# Patient Record
Sex: FEMALE | URBAN - METROPOLITAN AREA
[De-identification: names, ages, dates, MRNs, and addresses within clinical notes are randomized per-mention and may not be internally consistent; named-entity substitution may affect disease eponyms.]

---

## 2018-08-03 ENCOUNTER — HOSPITAL ENCOUNTER (INPATIENT)
Age: 58
LOS: 6 days | Discharge: HOME HEALTH | DRG: 885 | End: 2018-08-09
Attending: PSYCHIATRY & NEUROLOGY | Admitting: PSYCHIATRY & NEUROLOGY
Payer: MEDICARE

## 2018-08-03 PROBLEM — F33.3 DEPRESSION, MAJOR, RECURRENT, SEVERE WITH PSYCHOSIS (HCC): Status: ACTIVE | Noted: 2018-08-03

## 2018-08-03 PROBLEM — F20.9 SCHIZOPHRENIA (HCC): Status: ACTIVE | Noted: 2018-08-03

## 2018-08-03 PROCEDURE — 6370000000 HC RX 637 (ALT 250 FOR IP): Performed by: NURSE PRACTITIONER

## 2018-08-03 PROCEDURE — 1240000000 HC EMOTIONAL WELLNESS R&B

## 2018-08-03 RX ORDER — LORAZEPAM 2 MG/ML
1 INJECTION INTRAMUSCULAR EVERY 4 HOURS PRN
Status: DISCONTINUED | OUTPATIENT
Start: 2018-08-03 | End: 2018-08-09 | Stop reason: HOSPADM

## 2018-08-03 RX ORDER — RIZATRIPTAN BENZOATE 10 MG/1
10 TABLET ORAL DAILY PRN
COMMUNITY

## 2018-08-03 RX ORDER — TIZANIDINE 4 MG/1
4 TABLET ORAL 3 TIMES DAILY
COMMUNITY

## 2018-08-03 RX ORDER — THERMOMETER, ELECTRONIC,ORAL
1 EACH MISCELLANEOUS 2 TIMES DAILY
Status: DISCONTINUED | OUTPATIENT
Start: 2018-08-03 | End: 2018-08-09 | Stop reason: HOSPADM

## 2018-08-03 RX ORDER — SUMATRIPTAN 100 MG/1
100 TABLET, FILM COATED ORAL ONCE
Status: COMPLETED | OUTPATIENT
Start: 2018-08-03 | End: 2018-08-06

## 2018-08-03 RX ORDER — FLUOXETINE HYDROCHLORIDE 40 MG/1
40 CAPSULE ORAL DAILY
COMMUNITY

## 2018-08-03 RX ORDER — MAGNESIUM HYDROXIDE/ALUMINUM HYDROXICE/SIMETHICONE 120; 1200; 1200 MG/30ML; MG/30ML; MG/30ML
30 SUSPENSION ORAL EVERY 6 HOURS PRN
Status: DISCONTINUED | OUTPATIENT
Start: 2018-08-03 | End: 2018-08-09 | Stop reason: HOSPADM

## 2018-08-03 RX ORDER — OLANZAPINE 10 MG/1
10 TABLET ORAL NIGHTLY
COMMUNITY

## 2018-08-03 RX ORDER — TRAZODONE HYDROCHLORIDE 100 MG/1
200 TABLET ORAL NIGHTLY PRN
Status: DISCONTINUED | OUTPATIENT
Start: 2018-08-03 | End: 2018-08-09 | Stop reason: HOSPADM

## 2018-08-03 RX ORDER — ACETAMINOPHEN 325 MG/1
650 TABLET ORAL EVERY 4 HOURS PRN
Status: DISCONTINUED | OUTPATIENT
Start: 2018-08-03 | End: 2018-08-09 | Stop reason: HOSPADM

## 2018-08-03 RX ORDER — GABAPENTIN 600 MG/1
600 TABLET ORAL 4 TIMES DAILY
Status: DISCONTINUED | OUTPATIENT
Start: 2018-08-03 | End: 2018-08-09 | Stop reason: HOSPADM

## 2018-08-03 RX ORDER — OLANZAPINE 10 MG/1
10 TABLET ORAL NIGHTLY
Status: DISCONTINUED | OUTPATIENT
Start: 2018-08-03 | End: 2018-08-09 | Stop reason: HOSPADM

## 2018-08-03 RX ORDER — TRAZODONE HYDROCHLORIDE 100 MG/1
200 TABLET ORAL NIGHTLY PRN
COMMUNITY

## 2018-08-03 RX ORDER — ALPRAZOLAM 0.5 MG/1
0.5 TABLET ORAL DAILY
Status: ON HOLD | COMMUNITY
End: 2018-08-09 | Stop reason: HOSPADM

## 2018-08-03 RX ORDER — NICOTINE 21 MG/24HR
1 PATCH, TRANSDERMAL 24 HOURS TRANSDERMAL DAILY
Status: DISCONTINUED | OUTPATIENT
Start: 2018-08-03 | End: 2018-08-09 | Stop reason: HOSPADM

## 2018-08-03 RX ORDER — AMITRIPTYLINE HYDROCHLORIDE 25 MG/1
50 TABLET, FILM COATED ORAL NIGHTLY
Status: DISCONTINUED | OUTPATIENT
Start: 2018-08-03 | End: 2018-08-09 | Stop reason: HOSPADM

## 2018-08-03 RX ORDER — AMITRIPTYLINE HYDROCHLORIDE 50 MG/1
50 TABLET, FILM COATED ORAL NIGHTLY
COMMUNITY

## 2018-08-03 RX ORDER — BENZTROPINE MESYLATE 1 MG/ML
2 INJECTION INTRAMUSCULAR; INTRAVENOUS 2 TIMES DAILY PRN
Status: DISCONTINUED | OUTPATIENT
Start: 2018-08-03 | End: 2018-08-09 | Stop reason: HOSPADM

## 2018-08-03 RX ORDER — GABAPENTIN 600 MG/1
600 TABLET ORAL 4 TIMES DAILY
COMMUNITY

## 2018-08-03 RX ORDER — FLUOXETINE HYDROCHLORIDE 20 MG/1
40 CAPSULE ORAL DAILY
Status: DISCONTINUED | OUTPATIENT
Start: 2018-08-03 | End: 2018-08-09 | Stop reason: HOSPADM

## 2018-08-03 RX ORDER — HYDROXYZINE HYDROCHLORIDE 25 MG/1
25 TABLET, FILM COATED ORAL 4 TIMES DAILY PRN
Status: DISCONTINUED | OUTPATIENT
Start: 2018-08-03 | End: 2018-08-09 | Stop reason: HOSPADM

## 2018-08-03 RX ORDER — HYDROXYZINE PAMOATE 25 MG/1
25 CAPSULE ORAL 4 TIMES DAILY PRN
Status: ON HOLD | COMMUNITY
End: 2018-08-09 | Stop reason: HOSPADM

## 2018-08-03 RX ORDER — BUTALBITAL, ACETAMINOPHEN AND CAFFEINE 50; 325; 40 MG/1; MG/1; MG/1
1 TABLET ORAL 2 TIMES DAILY PRN
Status: DISCONTINUED | OUTPATIENT
Start: 2018-08-03 | End: 2018-08-09 | Stop reason: HOSPADM

## 2018-08-03 RX ORDER — BUTALBITAL, ACETAMINOPHEN AND CAFFEINE 50; 325; 40 MG/1; MG/1; MG/1
1 TABLET ORAL 2 TIMES DAILY PRN
COMMUNITY

## 2018-08-03 RX ORDER — THERMOMETER, ELECTRONIC,ORAL
1 EACH MISCELLANEOUS 2 TIMES DAILY
COMMUNITY

## 2018-08-03 RX ADMIN — BUTALBITAL, ACETAMINOPHEN AND CAFFEINE 1 TABLET: 50; 325; 40 TABLET ORAL at 22:26

## 2018-08-03 RX ADMIN — GABAPENTIN 600 MG: 600 TABLET, FILM COATED ORAL at 13:39

## 2018-08-03 RX ADMIN — OLANZAPINE 10 MG: 10 TABLET, FILM COATED ORAL at 22:29

## 2018-08-03 RX ADMIN — GABAPENTIN 600 MG: 600 TABLET, FILM COATED ORAL at 22:27

## 2018-08-03 RX ADMIN — FLUOXETINE HYDROCHLORIDE 40 MG: 20 CAPSULE ORAL at 13:39

## 2018-08-03 RX ADMIN — TOLNAFTATE 1 UNITS: 10 CREAM TOPICAL at 22:27

## 2018-08-03 RX ADMIN — AMITRIPTYLINE HYDROCHLORIDE 50 MG: 25 TABLET, FILM COATED ORAL at 22:27

## 2018-08-03 RX ADMIN — HYDROXYZINE HYDROCHLORIDE 25 MG: 25 TABLET, FILM COATED ORAL at 18:56

## 2018-08-03 RX ADMIN — GABAPENTIN 600 MG: 600 TABLET, FILM COATED ORAL at 18:51

## 2018-08-03 RX ADMIN — BUTALBITAL, ACETAMINOPHEN AND CAFFEINE 1 TABLET: 50; 325; 40 TABLET ORAL at 13:39

## 2018-08-03 RX ADMIN — TRAZODONE HYDROCHLORIDE 200 MG: 100 TABLET ORAL at 22:26

## 2018-08-03 ASSESSMENT — SLEEP AND FATIGUE QUESTIONNAIRES
AVERAGE NUMBER OF SLEEP HOURS: 2
DO YOU HAVE DIFFICULTY SLEEPING: YES
RESTFUL SLEEP: NO
DO YOU USE A SLEEP AID: NO
DIFFICULTY STAYING ASLEEP: YES
DO YOU HAVE DIFFICULTY SLEEPING: YES
DO YOU USE A SLEEP AID: NO
DIFFICULTY ARISING: NO
DIFFICULTY FALLING ASLEEP: YES
DIFFICULTY FALLING ASLEEP: YES
SLEEP PATTERN: DIFFICULTY FALLING ASLEEP;DISTURBED/INTERRUPTED SLEEP;INSOMNIA
DIFFICULTY ARISING: NO
AVERAGE NUMBER OF SLEEP HOURS: 2
DIFFICULTY STAYING ASLEEP: YES
SLEEP PATTERN: DIFFICULTY FALLING ASLEEP;DISTURBED/INTERRUPTED SLEEP;RESTLESSNESS;INSOMNIA
RESTFUL SLEEP: NO

## 2018-08-03 ASSESSMENT — PAIN SCALES - GENERAL
PAINLEVEL_OUTOF10: 0
PAINLEVEL_OUTOF10: 6
PAINLEVEL_OUTOF10: 3

## 2018-08-03 ASSESSMENT — LIFESTYLE VARIABLES
HISTORY_ALCOHOL_USE: NO
HISTORY_ALCOHOL_USE: NO

## 2018-08-03 NOTE — BH NOTE
Pt approached for RT assessment on this date and pt refused. Pt is in bed, refused to remove blanket and unable to complete assessment. Pt will be assessed at a later date.

## 2018-08-04 LAB
ANION GAP SERPL CALCULATED.3IONS-SCNC: 7 MMOL/L (ref 9–17)
BUN BLDV-MCNC: 14 MG/DL (ref 6–20)
BUN/CREAT BLD: ABNORMAL (ref 9–20)
CALCIUM SERPL-MCNC: 8.3 MG/DL (ref 8.6–10.4)
CHLORIDE BLD-SCNC: 107 MMOL/L (ref 98–107)
CHOLESTEROL/HDL RATIO: 1.9
CHOLESTEROL: 131 MG/DL
CO2: 27 MMOL/L (ref 20–31)
CREAT SERPL-MCNC: 0.54 MG/DL (ref 0.5–0.9)
GFR AFRICAN AMERICAN: >60 ML/MIN
GFR NON-AFRICAN AMERICAN: >60 ML/MIN
GFR SERPL CREATININE-BSD FRML MDRD: ABNORMAL ML/MIN/{1.73_M2}
GFR SERPL CREATININE-BSD FRML MDRD: ABNORMAL ML/MIN/{1.73_M2}
GLUCOSE BLD-MCNC: 102 MG/DL (ref 70–99)
HDLC SERPL-MCNC: 69 MG/DL
LDL CHOLESTEROL: 53 MG/DL (ref 0–130)
POTASSIUM SERPL-SCNC: 4.4 MMOL/L (ref 3.7–5.3)
SODIUM BLD-SCNC: 141 MMOL/L (ref 135–144)
TRIGL SERPL-MCNC: 44 MG/DL
VLDLC SERPL CALC-MCNC: NORMAL MG/DL (ref 1–30)

## 2018-08-04 PROCEDURE — 1240000000 HC EMOTIONAL WELLNESS R&B

## 2018-08-04 PROCEDURE — 90792 PSYCH DIAG EVAL W/MED SRVCS: CPT | Performed by: REGISTERED NURSE

## 2018-08-04 PROCEDURE — 36415 COLL VENOUS BLD VENIPUNCTURE: CPT

## 2018-08-04 PROCEDURE — 6370000000 HC RX 637 (ALT 250 FOR IP): Performed by: NURSE PRACTITIONER

## 2018-08-04 PROCEDURE — 80048 BASIC METABOLIC PNL TOTAL CA: CPT

## 2018-08-04 PROCEDURE — 83036 HEMOGLOBIN GLYCOSYLATED A1C: CPT

## 2018-08-04 PROCEDURE — 99232 SBSQ HOSP IP/OBS MODERATE 35: CPT | Performed by: INTERNAL MEDICINE

## 2018-08-04 PROCEDURE — 80061 LIPID PANEL: CPT

## 2018-08-04 RX ADMIN — FLUOXETINE HYDROCHLORIDE 40 MG: 20 CAPSULE ORAL at 09:17

## 2018-08-04 RX ADMIN — TOLNAFTATE 1 UNITS: 10 CREAM TOPICAL at 09:17

## 2018-08-04 RX ADMIN — GABAPENTIN 600 MG: 600 TABLET, FILM COATED ORAL at 22:13

## 2018-08-04 RX ADMIN — TOLNAFTATE 1 UNITS: 10 CREAM TOPICAL at 22:12

## 2018-08-04 RX ADMIN — HYDROXYZINE HYDROCHLORIDE 25 MG: 25 TABLET, FILM COATED ORAL at 12:43

## 2018-08-04 RX ADMIN — GABAPENTIN 600 MG: 600 TABLET, FILM COATED ORAL at 12:43

## 2018-08-04 RX ADMIN — GABAPENTIN 600 MG: 600 TABLET, FILM COATED ORAL at 17:36

## 2018-08-04 RX ADMIN — AMITRIPTYLINE HYDROCHLORIDE 50 MG: 25 TABLET, FILM COATED ORAL at 22:13

## 2018-08-04 RX ADMIN — GABAPENTIN 600 MG: 600 TABLET, FILM COATED ORAL at 09:18

## 2018-08-04 RX ADMIN — HYDROXYZINE HYDROCHLORIDE 25 MG: 25 TABLET, FILM COATED ORAL at 22:14

## 2018-08-04 RX ADMIN — OLANZAPINE 10 MG: 10 TABLET, FILM COATED ORAL at 22:13

## 2018-08-04 RX ADMIN — TRAZODONE HYDROCHLORIDE 200 MG: 100 TABLET ORAL at 22:13

## 2018-08-05 LAB
ESTIMATED AVERAGE GLUCOSE: 100 MG/DL
HBA1C MFR BLD: 5.1 % (ref 4–6)

## 2018-08-05 PROCEDURE — 6370000000 HC RX 637 (ALT 250 FOR IP): Performed by: NURSE PRACTITIONER

## 2018-08-05 PROCEDURE — 1240000000 HC EMOTIONAL WELLNESS R&B

## 2018-08-05 RX ADMIN — GABAPENTIN 600 MG: 600 TABLET, FILM COATED ORAL at 09:02

## 2018-08-05 RX ADMIN — GABAPENTIN 600 MG: 600 TABLET, FILM COATED ORAL at 17:51

## 2018-08-05 RX ADMIN — AMITRIPTYLINE HYDROCHLORIDE 50 MG: 25 TABLET, FILM COATED ORAL at 23:08

## 2018-08-05 RX ADMIN — GABAPENTIN 600 MG: 600 TABLET, FILM COATED ORAL at 23:08

## 2018-08-05 RX ADMIN — HYDROXYZINE HYDROCHLORIDE 25 MG: 25 TABLET, FILM COATED ORAL at 09:14

## 2018-08-05 RX ADMIN — HYDROXYZINE HYDROCHLORIDE 25 MG: 25 TABLET, FILM COATED ORAL at 23:07

## 2018-08-05 RX ADMIN — GABAPENTIN 600 MG: 600 TABLET, FILM COATED ORAL at 13:44

## 2018-08-05 RX ADMIN — TRAZODONE HYDROCHLORIDE 200 MG: 100 TABLET ORAL at 23:08

## 2018-08-05 RX ADMIN — TOLNAFTATE 1 UNITS: 10 CREAM TOPICAL at 09:04

## 2018-08-05 RX ADMIN — TOLNAFTATE 1 UNITS: 10 CREAM TOPICAL at 23:07

## 2018-08-05 RX ADMIN — FLUOXETINE HYDROCHLORIDE 40 MG: 20 CAPSULE ORAL at 09:02

## 2018-08-05 RX ADMIN — HYDROXYZINE HYDROCHLORIDE 25 MG: 25 TABLET, FILM COATED ORAL at 17:51

## 2018-08-05 RX ADMIN — OLANZAPINE 10 MG: 10 TABLET, FILM COATED ORAL at 23:08

## 2018-08-05 NOTE — CONSULTS
[address] [facility].    Date:   8/4/2018  Patient name: Sherryle Actis  Date of admission:  8/3/2018 10:11 AM  MRN:   [id number]  YOB: 1960    Cc hypokalemia       HPI   Pt admitted with sympts of depression   Consult for hypokalemia k is 4.4       History reviewed. No pertinent past medical history. Family History   Problem Relation Age of Onset    Family history unknown: Yes      reports that she has been smoking. She has been smoking about 0.50 packs per day. She has never used smokeless tobacco. She reports that she does not drink alcohol or use drugs. History reviewed. No pertinent past medical history. No Known Allergies    Review of systems    Constitutional: Negative for fever and fatigue. Respiratory: Negative for cough and shortness of breath. Cardiovascular: Negative for chest pain, palpitations and leg swelling. Gastrointestinal: Negative for abdominal pain, diarrhea and blood in stool. Endocrine: Negative for cold intolerance. Genitourinary: Negative for dysuria and frequency. Musculoskeletal: Negative for back pain and arthralgias. Skin: Negative for color change and rash. Neurological: Negative for dizziness and headaches. Psychiatric/Behavioral: Negative for confusion. ROS  Physical exam     BP (!) 140/98   Pulse 120   Temp 98 °F (36.7 °C) (Oral)   Resp 14   Ht 5' 7\" (1.702 m)   Wt 100 lb (45.4 kg)   LMP  (LMP Unknown)   Breastfeeding? No   BMI 15.66 kg/m²      General appearance: well nourished in no distress  Eyes:  MOHINDER   Head: AT/NC  ENT NAD   Neck: Trachea midline; supple  Lungs: normal effort, clear to auscultation. CVS sinus with no murmurs. Vasc No JVP, no carotid bruit  Abdomen: Soft, non-tender; no masses or HSM,   Extremities: no edema; no digital cyanosis or clubbing.   Musculoskeletal NO joint effusion or synovitis  Skin: No rash or ulcers  Neurologic: Cranial nerves II-XII grossly intact; no motor deficit. Psych: Appropriate affect, alert and oriented to person, place and time  NO lymphadenopathy            Relevant Labs/Imaging     CBC: No results for input(s): WBC, HGB, PLT in the last 72 hours. BMP:    Recent Labs      08/04/18   0803   NA  141   K  4.4   CL  107   CO2  27   BUN  14   CREATININE  0.54   GLUCOSE  102*     S. Calcium:  Recent Labs      08/04/18   0803   CALCIUM  8.3*     Glycosylated hemoglobin A1C: No results for input(s): LABA1C in the last 72 hours. BNP:No results for input(s): BNP in the last 72 hours. Assessment / Plan      Patient Active Problem List   Diagnosis    Depression, major, recurrent, severe with psychosis ([facility]    Schizophrenia ([facility] [address] [facility]     A/p  Pt is stable with normal k now will  Sign off thanks        MD IQRA Armstrong[address], [address] [place] [address].    Phone [phone]   Fax: [phone]  Answering Service: [phone]
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
## 2018-08-05 NOTE — BH NOTE
Psychoeducation Group Note    Date: 8/5/18 Start Time: 1600 End Time: 1630    Number Participants in Group:  22/22    Goal:  Patient will demonstrate increased interpersonal interaction   Topic: Safety Group    Discipline Responsible:   OT  AT     X Nsg.  RT  Other       Participation Level:     None  Minimal    Active Listener X Interactive    Monopolizing         Participation Quality:  X Appropriate  Inappropriate          Attentive        Intrusive          Sharing        Resistant          Supportive        Lethargic       Affective:   X Congruent  Incongruent  Blunted  Flat    Constricted  Anxious  Elated  Angry    Labile  Depressed  Other         Cognitive:  X Alert  Oriented PPTP     Concentration X G  F  P   Attention Span X G  F  P   Short-Term Memory X G  F  P   Long-Term Memory X G  F  P   ProblemSolving/  Decision Making X G  F  P   Ability to Process  Information X G  F  P      Contributing Factors             Delusional             Hallucinating             Flight of Ideas             Other:       Modes of Intervention:  X Education  Support  Exploration    Clarifying  Problem Solving  Confrontation    Socialization  Limit Setting  Reality Testing    Activity  Movement  Media    Other:            Response to Learning:  X Able to verbalize current knowledge/experience    Able to verbalize/acknowledge new learning    Able to retain information    Capable of insight    Able to change behavior    Progressing to goal    Other:        Comments:

## 2018-08-06 PROCEDURE — 6370000000 HC RX 637 (ALT 250 FOR IP): Performed by: NURSE PRACTITIONER

## 2018-08-06 PROCEDURE — 99232 SBSQ HOSP IP/OBS MODERATE 35: CPT | Performed by: PSYCHIATRY & NEUROLOGY

## 2018-08-06 PROCEDURE — 1240000000 HC EMOTIONAL WELLNESS R&B

## 2018-08-06 RX ADMIN — GABAPENTIN 600 MG: 600 TABLET, FILM COATED ORAL at 17:25

## 2018-08-06 RX ADMIN — AMITRIPTYLINE HYDROCHLORIDE 50 MG: 25 TABLET, FILM COATED ORAL at 21:19

## 2018-08-06 RX ADMIN — GABAPENTIN 600 MG: 600 TABLET, FILM COATED ORAL at 12:30

## 2018-08-06 RX ADMIN — GABAPENTIN 600 MG: 600 TABLET, FILM COATED ORAL at 21:19

## 2018-08-06 RX ADMIN — GABAPENTIN 600 MG: 600 TABLET, FILM COATED ORAL at 08:31

## 2018-08-06 RX ADMIN — FLUOXETINE HYDROCHLORIDE 40 MG: 20 CAPSULE ORAL at 08:31

## 2018-08-06 RX ADMIN — TOLNAFTATE 1 UNITS: 10 CREAM TOPICAL at 08:31

## 2018-08-06 RX ADMIN — HYDROXYZINE HYDROCHLORIDE 25 MG: 25 TABLET, FILM COATED ORAL at 08:31

## 2018-08-06 RX ADMIN — OLANZAPINE 10 MG: 10 TABLET, FILM COATED ORAL at 21:19

## 2018-08-06 RX ADMIN — TRAZODONE HYDROCHLORIDE 200 MG: 100 TABLET ORAL at 21:19

## 2018-08-06 RX ADMIN — SUMATRIPTAN SUCCINATE 100 MG: 100 TABLET ORAL at 15:39

## 2018-08-06 RX ADMIN — HYDROXYZINE HYDROCHLORIDE 25 MG: 25 TABLET, FILM COATED ORAL at 21:19

## 2018-08-06 RX ADMIN — TOLNAFTATE 1 UNITS: 10 CREAM TOPICAL at 22:13

## 2018-08-06 ASSESSMENT — PAIN SCALES - GENERAL: PAINLEVEL_OUTOF10: 7

## 2018-08-07 PROCEDURE — 1240000000 HC EMOTIONAL WELLNESS R&B

## 2018-08-07 PROCEDURE — 99232 SBSQ HOSP IP/OBS MODERATE 35: CPT | Performed by: PSYCHIATRY & NEUROLOGY

## 2018-08-07 PROCEDURE — 6370000000 HC RX 637 (ALT 250 FOR IP): Performed by: NURSE PRACTITIONER

## 2018-08-07 RX ADMIN — TOLNAFTATE 1 UNITS: 10 CREAM TOPICAL at 09:38

## 2018-08-07 RX ADMIN — GABAPENTIN 600 MG: 600 TABLET, FILM COATED ORAL at 13:06

## 2018-08-07 RX ADMIN — BUTALBITAL, ACETAMINOPHEN AND CAFFEINE 1 TABLET: 50; 325; 40 TABLET ORAL at 11:08

## 2018-08-07 RX ADMIN — GABAPENTIN 600 MG: 600 TABLET, FILM COATED ORAL at 20:55

## 2018-08-07 RX ADMIN — GABAPENTIN 600 MG: 600 TABLET, FILM COATED ORAL at 09:37

## 2018-08-07 RX ADMIN — GABAPENTIN 600 MG: 600 TABLET, FILM COATED ORAL at 17:37

## 2018-08-07 RX ADMIN — OLANZAPINE 10 MG: 10 TABLET, FILM COATED ORAL at 20:55

## 2018-08-07 RX ADMIN — HYDROXYZINE HYDROCHLORIDE 25 MG: 25 TABLET, FILM COATED ORAL at 17:39

## 2018-08-07 RX ADMIN — TRAZODONE HYDROCHLORIDE 200 MG: 100 TABLET ORAL at 20:55

## 2018-08-07 RX ADMIN — TOLNAFTATE 1 UNITS: 10 CREAM TOPICAL at 20:55

## 2018-08-07 RX ADMIN — FLUOXETINE HYDROCHLORIDE 40 MG: 20 CAPSULE ORAL at 09:37

## 2018-08-07 RX ADMIN — AMITRIPTYLINE HYDROCHLORIDE 50 MG: 25 TABLET, FILM COATED ORAL at 20:55

## 2018-08-07 RX ADMIN — HYDROXYZINE HYDROCHLORIDE 25 MG: 25 TABLET, FILM COATED ORAL at 09:37

## 2018-08-07 ASSESSMENT — PAIN SCALES - GENERAL
PAINLEVEL_OUTOF10: 0
PAINLEVEL_OUTOF10: 4

## 2018-08-08 PROCEDURE — 6370000000 HC RX 637 (ALT 250 FOR IP): Performed by: PSYCHIATRY & NEUROLOGY

## 2018-08-08 PROCEDURE — 1240000000 HC EMOTIONAL WELLNESS R&B

## 2018-08-08 PROCEDURE — 6370000000 HC RX 637 (ALT 250 FOR IP): Performed by: NURSE PRACTITIONER

## 2018-08-08 PROCEDURE — 99232 SBSQ HOSP IP/OBS MODERATE 35: CPT | Performed by: PSYCHIATRY & NEUROLOGY

## 2018-08-08 RX ORDER — SUMATRIPTAN 100 MG/1
100 TABLET, FILM COATED ORAL EVERY 12 HOURS PRN
Status: DISCONTINUED | OUTPATIENT
Start: 2018-08-08 | End: 2018-08-09 | Stop reason: HOSPADM

## 2018-08-08 RX ADMIN — OLANZAPINE 10 MG: 10 TABLET, FILM COATED ORAL at 21:02

## 2018-08-08 RX ADMIN — GABAPENTIN 600 MG: 600 TABLET, FILM COATED ORAL at 21:02

## 2018-08-08 RX ADMIN — FLUOXETINE HYDROCHLORIDE 40 MG: 20 CAPSULE ORAL at 09:45

## 2018-08-08 RX ADMIN — GABAPENTIN 600 MG: 600 TABLET, FILM COATED ORAL at 09:45

## 2018-08-08 RX ADMIN — HYDROXYZINE HYDROCHLORIDE 25 MG: 25 TABLET, FILM COATED ORAL at 19:59

## 2018-08-08 RX ADMIN — TRAZODONE HYDROCHLORIDE 200 MG: 100 TABLET ORAL at 21:02

## 2018-08-08 RX ADMIN — SUMATRIPTAN SUCCINATE 100 MG: 100 TABLET ORAL at 15:20

## 2018-08-08 RX ADMIN — AMITRIPTYLINE HYDROCHLORIDE 50 MG: 25 TABLET, FILM COATED ORAL at 21:02

## 2018-08-08 RX ADMIN — BUTALBITAL, ACETAMINOPHEN AND CAFFEINE 1 TABLET: 50; 325; 40 TABLET ORAL at 07:50

## 2018-08-08 RX ADMIN — HYDROXYZINE HYDROCHLORIDE 25 MG: 25 TABLET, FILM COATED ORAL at 07:50

## 2018-08-08 RX ADMIN — GABAPENTIN 600 MG: 600 TABLET, FILM COATED ORAL at 15:20

## 2018-08-08 ASSESSMENT — PAIN SCALES - GENERAL
PAINLEVEL_OUTOF10: 2
PAINLEVEL_OUTOF10: 5
PAINLEVEL_OUTOF10: 6
PAINLEVEL_OUTOF10: 2

## 2018-08-08 ASSESSMENT — PAIN DESCRIPTION - LOCATION: LOCATION: HEAD

## 2018-08-08 ASSESSMENT — PAIN DESCRIPTION - PAIN TYPE: TYPE: ACUTE PAIN

## 2018-08-08 NOTE — PROGRESS NOTES
Patient did not attend trivia and socialization skills group from [id number] despite staff encouragement and prompts.
Psychiatric Admission Note         Sierra Silvestre is a 63 y.o. female who was admitted from [facility] for acute psychosis and delusions of bugs in the walls of her home and on her and under her skin. She was sent here on pink slip. She states she continues to feel she has worms that are yellow and are around her nails. Her cuticles have scabs from where she \"pulled the worm out but it came right back\". She displays thought blocking and disorganization of thought process. Displays poor insight. Patient is clearly unable to care for themself and keep themself safe. Denies side effects to medication regimen. Charting and medications reviewed. Therapeutic support provided. Past Psychiatric History   Patient Reports noncompliance with outpatient psychiatric care. Reported history of psychiatric inpatient hospitalizations. Reported history of suicide attempts. History of Substance Abuse     Denies alcohol use or use of any illicit drugs. Family History of psychiatric disorders    Family history: denied . Medical History   Allergies:  Patient has no known allergies. History reviewed. No pertinent past medical history. History reviewed. No pertinent surgical history. SOCIAL HISTORY  Social History     Social History    Marital status: Unknown     Spouse name: N/A    Number of children: N/A    Years of education: N/A     Occupational History    Not on file. Social History Main Topics    Smoking status: Current Every Day Smoker     Packs/day: 0.50    Smokeless tobacco: Never Used    Alcohol use No      Comment: unknown    Drug use: No    Sexual activity: Yes     Partners: Male     Other Topics Concern    Not on file     Social History Narrative    No narrative on file         Mental Status  Pt. was alert, fully oriented, and cooperative. Appearance and hygiene weredisheveled, poor hygiene . Mood was depressed.  Affect was \"dysthymic and poorly reactive Thought
Pt did not participate in exercise group at 1430 despite staff encouragement to attend.
Pt did not participate in relapse prevention/ discharge planning group at 1330 despite staff encouragement to attend.
RT ASSESSMENT TREATMENT GOALS    [x]Pt Goal:  Pt will identify 1-2 positive coping skills by time of discharge. []Pt Goal:  Pt will identify 1-2 positive aspects of self by time of discharge. []Pt Goal:  Pt will remain on task/topic for 15-30 minutes during group by time of discharge. []Pt Goal:  Pt will identify 1-2 aspects of relapse prevention plan by time of discharge. []Pt Goal:  Pt will join in conversation with peers 1-2 times per group by time of discharge. []Pt Goal:  Pt will identify 1-2 new leisure interests by time of discharge. []Pt Goal:  Pt will not voice any delusional content by time of discharge.
PRN  OLANZapine (ZYPREXA) tablet 10 mg, 10 mg, Oral, Nightly  tolnaftate (TINACTIN) 1 % cream 1 Units, 1 Units, Topical, BID  traZODone (DESYREL) tablet 200 mg, 200 mg, Oral, Nightly PRN  acetaminophen (TYLENOL) tablet 650 mg, 650 mg, Oral, Q4H PRN  benztropine mesylate (COGENTIN) injection 2 mg, 2 mg, Intramuscular, BID PRN  magnesium hydroxide (MILK OF MAGNESIA) 400 MG/5ML suspension 30 mL, 30 mL, Oral, Daily PRN  aluminum & magnesium hydroxide-simethicone (MAALOX) 200-200-20 MG/5ML suspension 30 mL, 30 mL, Oral, Q6H PRN  nicotine (NICODERM CQ) 14 MG/24HR 1 patch, 1 patch, Transdermal, Daily  nicotine polacrilex (NICORETTE) gum 2 mg, 2 mg, Oral, Q2H PRN  LORazepam (ATIVAN) injection 1 mg, 1 mg, Intramuscular, Q4H PRN    ASSESSMENT     Principal Problem:    Depression, major, recurrent, severe with psychosis (HCC)  Active Problems:    Schizophrenia (HCC)    Hypokalemia  Resolved Problems:    * No resolved hospital problems. *      PLAN    · She was restarted on fluoxetine and Zyprexa. · Sumatriptan for migraine headache. · Continue group psychotherapy and unit milieu.     Electronically Signed by Celestino Louie MD , 8/8/2018 1:22 PM

## 2018-08-09 VITALS
RESPIRATION RATE: 14 BRPM | BODY MASS INDEX: 15.7 KG/M2 | DIASTOLIC BLOOD PRESSURE: 84 MMHG | WEIGHT: 100 LBS | HEIGHT: 67 IN | TEMPERATURE: 97.4 F | SYSTOLIC BLOOD PRESSURE: 128 MMHG | HEART RATE: 111 BPM

## 2018-08-09 PROCEDURE — 99239 HOSP IP/OBS DSCHRG MGMT >30: CPT | Performed by: PSYCHIATRY & NEUROLOGY

## 2018-08-09 PROCEDURE — 6370000000 HC RX 637 (ALT 250 FOR IP): Performed by: NURSE PRACTITIONER

## 2018-08-09 PROCEDURE — 5130000000 HC BRIDGE APPOINTMENT

## 2018-08-09 RX ORDER — HYDROXYZINE HYDROCHLORIDE 25 MG/1
25 TABLET, FILM COATED ORAL 4 TIMES DAILY PRN
Qty: 90 TABLET | Refills: 0 | Status: SHIPPED | OUTPATIENT
Start: 2018-08-09 | End: 2018-09-08

## 2018-08-09 RX ADMIN — BUTALBITAL, ACETAMINOPHEN AND CAFFEINE 1 TABLET: 50; 325; 40 TABLET ORAL at 08:11

## 2018-08-09 RX ADMIN — GABAPENTIN 600 MG: 600 TABLET, FILM COATED ORAL at 08:11

## 2018-08-09 RX ADMIN — FLUOXETINE HYDROCHLORIDE 40 MG: 20 CAPSULE ORAL at 08:11

## 2018-08-09 RX ADMIN — HYDROXYZINE HYDROCHLORIDE 25 MG: 25 TABLET, FILM COATED ORAL at 08:14

## 2018-08-09 ASSESSMENT — PAIN SCALES - GENERAL
PAINLEVEL_OUTOF10: 1
PAINLEVEL_OUTOF10: 3

## 2018-08-09 NOTE — BH NOTE
[facility]  Discharge Note    Pt discharged with followings belongings:   Dentures: Uppers  Hearing Aid: None  Body Piercings Removed: No  Clothing: Undergarments (Comment), Footwear  Were All Patient Medications Collected?: Not Applicable  Other Valuables: Cell phone   Valuables sent home with*patient Valuables retrieved from safe, Security envelope number:  [id number], [phone] and returned to patient. Patient left department with Departure Mode: Family member (in car) via Mobility at Departure: Ambulatory, discharged to Discharged to: Private Residence. Patient education on aftercare instructions: given  Information faxed to [facility]  by staff. Patient verbalize understanding of AVS:  yes.     Status EXAM upon discharge:  Status and Exam  Normal: No  Facial Expression: Flat  Affect: Blunt  Level of Consciousness: Alert  Mood:Normal: No  Mood: Depressed  Motor Activity:Normal: Yes  Motor Activity: Decreased  Interview Behavior: Cooperative  Preception: [place] to Person, Janie Ahumada to Time, [place] to Situation, [place] to Place  Attention:Normal: No  Attention: Distractible  Thought Processes: Circumstantial  Thought Content:Normal: Yes  Thought Content: Preoccupations  Hallucinations: None  Delusions: No  Delusions: Obsessions  Memory:Normal: No  Memory: Poor Recent  Insight and Judgment: No  Insight and Judgment: Poor Insight, Poor Judgment  Present Suicidal Ideation: No  Present Homicidal Ideation: No    Seymour Nation RN

## 2018-08-09 NOTE — PLAN OF CARE
Problem: Altered Mood, Psychotic Behavior:  Goal: Ability to interact with others will improve  Ability to interact with others will improve    Pt has been social and active on the unit. Looking forward to discharge Thurs.
Problem: Altered Mood, Psychotic Behavior:  Goal: Ability to interact with others will improve  Ability to interact with others will improve   Outcome: Ongoing  Pt did not attend community meeting and goal setting group at 0900 despite staff invitation to attend.
Problem: Altered Mood, Psychotic Behavior:  Goal: Able to verbalize decrease in frequency and intensity of hallucinations  Able to verbalize decrease in frequency and intensity of hallucinations    Outcome: Ongoing  Patient denies hallucinations. Goal: Absence of self-harm  Absence of self-harm    Outcome: Ongoing  Patient has remained free from harm. Every 15 minute and spontaneous safety checks have been maintained. Goal: Ability to interact with others will improve  Ability to interact with others will improve    Outcome: Ongoing  Patient was social with peers at meal times, but rested in her room for the majority of the morning. Problem: Skin Integrity:  Goal: Will show no infection signs and symptoms  Will show no infection signs and symptoms   Outcome: Ongoing  Patient wound did not she signs and symptoms of infection. Goal: Absence of new skin breakdown  Absence of new skin breakdown   Outcome: Ongoing  There was an absence of new skin breakdown.
Problem: Altered Mood, Psychotic Behavior:  Goal: Able to verbalize decrease in frequency and intensity of hallucinations  Able to verbalize decrease in frequency and intensity of hallucinations    Outcome: Ongoing  Patient has flat affect and offers little insight throughout talk time with writer. Patient denies any type of hallucinations, and does not appear to be responding. Will continue to monitor. Goal: Absence of self-harm  Absence of self-harm    Outcome: Ongoing  Patient denies any suicidal or homicidal thoughts. Patient reports feeling ready for discharge. Patient maintains behavioral control. Patient remains free from self-harm. 15 minute checks maintained for patient safety. Will continue to monitor and provide support and reassurance as needed. Goal: Ability to interact with others will improve  Ability to interact with others will improve    Outcome: Ongoing  Patient out of room some, although interacts minimally. Patient reported attending all groups today and enjoying them.
Problem: Altered Mood, Psychotic Behavior:  Goal: Able to verbalize decrease in frequency and intensity of hallucinations  Able to verbalize decrease in frequency and intensity of hallucinations    Outcome: Ongoing  Patient is medication compliant and in behavioral control. Patient has attended select groups and been social with peers in the common areas of the unit. Patient denies suicidal and homicidal ideation. Patient denies auditory and visual hallucinations. Patient has remained free from harm. Every 15 minute and spontaneous safety checks have been maintained. Goal: Absence of self-harm  Absence of self-harm    Outcome: Ongoing  Patient has remained free from harm. Every 15 minute and spontaneous safety checks have been maintained. Goal: Ability to interact with others will improve  Ability to interact with others will improve    Outcome: Ongoing  Patient has exhibited improved interaction with others. Problem: Skin Integrity:  Goal: Will show no infection signs and symptoms  Will show no infection signs and symptoms   Outcome: Ongoing  Patient did not exhibit signs or symptoms of infection. Goal: Absence of new skin breakdown  Absence of new skin breakdown   Outcome: Ongoing  Patient has no areas of new skin breakdown.     Problem: Pain:  Goal: Pain level will decrease  Pain level will decrease   Outcome: Ongoing    Goal: Control of acute pain  Control of acute pain   Outcome: Ongoing    Goal: Control of chronic pain  Control of chronic pain   Outcome: Ongoing
Problem: Altered Mood, Psychotic Behavior:  Goal: Able to verbalize decrease in frequency and intensity of hallucinations  Able to verbalize decrease in frequency and intensity of hallucinations    Outcome: Ongoing  Pt did not attend vision board and goal setting skills group at [phone] despite staff invitation to attend.
Problem: Altered Mood, Psychotic Behavior:  Goal: Able to verbalize decrease in frequency and intensity of hallucinations  Able to verbalize decrease in frequency and intensity of hallucinations    Patient did not attend goal setting and community meeting from 1358-1435 despite staff encouragement and prompts.
Problem: Altered Mood, Psychotic Behavior:  Goal: Able to verbalize decrease in frequency and intensity of hallucinations  Able to verbalize decrease in frequency and intensity of hallucinations    Patient did not attend trivia skills group from [id number] despite staff encouragement and prompts.
Problem: Altered Mood, Psychotic Behavior:  Goal: Able to verbalize decrease in frequency and intensity of hallucinations  Able to verbalize decrease in frequency and intensity of hallucinations   Outcome: Ongoing    Goal: Absence of self-harm  Absence of self-harm   Outcome: Ongoing  Pt denies any thoughts of hurting self, reports some anxiety and depression,  calm controlled pleasant upon approach isolative aloof of peers, mostly out for needs, MC/BC. Spontaneous safety checks to be maintained by staff.
Problem: Altered Mood, Psychotic Behavior:  Goal: Able to verbalize reality based thinking  Able to verbalize reality based thinking   Outcome: Ongoing    Goal: Absence of self-harm  Absence of self-harm   Outcome: Ongoing  Pt denies all at this time except some anxiety and depression. Calm controlled cooperative with treatment, cooperative during 1:1. Reports normal sleep and appetite. Isolative mostly out for needs. Spontaneous safety checks to be maintained by staff.
Problem: Altered Mood, Psychotic Behavior:  Goal: Able to verbalize reality based thinking  Able to verbalize reality based thinking   Outcome: Ongoing  Pt did not attend mental and physical health education group at 1330 despite staff invitation to attend.
Problem: Altered Mood, Psychotic Behavior:  Goal: Absence of self-harm  Absence of self-harm    No self-harm or SI
Problem: Altered Mood, Psychotic Behavior:  Goal: Absence of self-harm  Absence of self-harm    Outcome: Ongoing  Pt denies thoughts of self harm and is agreeable to seeking out should thoughts of self harm arise. Safe environment maintained. Q15 minute checks for safety cont per unit policy. Will cont to monitor for safety and provides support and reassurance as needed.
Problem: Altered Mood, Psychotic Behavior:  Goal: Absence of self-harm  Absence of self-harm   Outcome: Ongoing  PT denies all. PT rates dep/anx 8/10. PT has been isolative to her room.
Problem: Altered Mood, Psychotic Behavior:  Intervention: Group therapy sessions to increase activity level  Pt declined to attend education group at 10 am despite encouragement.   Pt offered 1:1 and refused
Problem: Altered Mood, Psychotic Behavior:  Intervention: Group therapy sessions to increase activity level  Pt declined to attend education group at 10 am despite encouragement.   Pt offered 1:1 and refused
GOALS UPDATE:   Time frame for Long-Term Goals: 6 months  Members Present in Team Meeting: See Signature [address] Jane Muir

## 2018-08-09 NOTE — DISCHARGE SUMMARY
Patient ID:  Conchis Dalal  [id number]  26 y.o.  1960    Admit date: 8/3/2018    Discharge date and time: 8/9/2018  10:51 AM     Admitting Physician: Altagracia Clark MD     Discharge Physician: Olga White MD    Admission Diagnoses: Depression, major, recurrent, severe with psychosis ([facility] [address] [F33.3]  Schizophrenia ([facility] [address] [F20.9]    Discharge Diagnoses:   Depression, major, recurrent, severe with psychosis ([address]     Patient Active Problem List   Diagnosis Code    Depression, major, recurrent, severe with psychosis ([facility] [address] F33.3    Schizophrenia ([facility] [address] F20.9    Hypokalemia E87.6        Admission Condition: poor    Discharged Condition: stable    Indication for Admission: threat to self    History of Present Illnes (present tense wording indicates findings from admission exam on 8/3/2018 and are not necessarily indicative of current findings): Conchis Dalal is a 63 y.o. female who was admitted from [facility] for acute psychosis and delusions of bugs in the walls of her home and on her and under her skin. She was sent here on pink slip. She states she continues to feel she has worms that are yellow and are around her nails. Her cuticles have scabs from where she \"pulled the worm out but it came right back\". She displays thought blocking and disorganization of thought process. Displays poor insight. Patient is clearly unable to care for themself and keep themself safe. Denies side effects to medication regimen. Charting and medications reviewed. Therapeutic support provided. Hospital Course:   Upon admission, Conchis Dalal was provided a safe secure environment, introduced to unit milieu. Patient participated in groups and individual therapies. Olanzapine and fluoxetine were restarted       After few days of hospital care, patient began to feel improvement. Depression lifted, thoughts to harm self ceased. The psychotic symptoms resolved. Sleep improved, appetite was good.  On morning rounds abnormalities noted  Attitude toward examiner:  Cooperative, attentive, good eye contact  Speech:  spontaneous, normal rate, normal volume and well articulated  Mood:  euthymic  Affect:  Mood-congruent with normal range  Thought processes:  linear, goal directed and coherent  Thought content:  No Homocidal ideation  Suicidal Ideation:  No suicidal ideation  Delusions:  no evidence of delusions  Perceptual Disturbance:  denies any perceptual disturbance  Cognition:  Intact  Memory: age appropriate  Insight & Judgement: fair  Medication side effects:  Denies any. Disposition: home    Patient Instructions: Activity: activity as tolerated    Follow-up as scheduled with psychiatric care within 1 week (see discharge papers)    Time Spent: 35 minutes    Engagement: Patient displayed a good level of engagement with the treatments offered during this admission.        Discharge planning, findings, and recommendations were discussed with and approved by Dr. Anette Pham MD    Signed:  Anette Pham   8/9/2018  10:51 AM

## 2022-03-13 NOTE — DISCHARGE INSTR - DIET
